# Patient Record
Sex: MALE | Race: BLACK OR AFRICAN AMERICAN | Employment: FULL TIME | URBAN - METROPOLITAN AREA
[De-identification: names, ages, dates, MRNs, and addresses within clinical notes are randomized per-mention and may not be internally consistent; named-entity substitution may affect disease eponyms.]

---

## 2024-07-24 ENCOUNTER — HOSPITAL ENCOUNTER (EMERGENCY)
Facility: HOSPITAL | Age: 49
Discharge: HOME | End: 2024-07-25
Attending: EMERGENCY MEDICINE
Payer: COMMERCIAL

## 2024-07-24 DIAGNOSIS — M11.20 PSEUDOGOUT: Primary | ICD-10-CM

## 2024-07-24 LAB
ANION GAP SERPL CALC-SCNC: 5 MMOL/L (ref 0–18)
BASOPHILS # BLD AUTO: 0.03 X10(3) UL (ref 0–0.2)
BASOPHILS NFR BLD AUTO: 0.3 %
BUN BLD-MCNC: 10 MG/DL (ref 9–23)
CALCIUM BLD-MCNC: 9.7 MG/DL (ref 8.7–10.4)
CHLORIDE SERPL-SCNC: 100 MMOL/L (ref 98–112)
CO2 SERPL-SCNC: 30 MMOL/L (ref 21–32)
CREAT BLD-MCNC: 1.13 MG/DL
EGFRCR SERPLBLD CKD-EPI 2021: 81 ML/MIN/1.73M2 (ref 60–?)
EOSINOPHIL # BLD AUTO: 0.24 X10(3) UL (ref 0–0.7)
EOSINOPHIL NFR BLD AUTO: 2.4 %
ERYTHROCYTE [DISTWIDTH] IN BLOOD BY AUTOMATED COUNT: 15.4 %
GLUCOSE BLD-MCNC: 125 MG/DL (ref 70–99)
HCT VFR BLD AUTO: 41.6 %
HGB BLD-MCNC: 14.6 G/DL
IMM GRANULOCYTES # BLD AUTO: 0.03 X10(3) UL (ref 0–1)
IMM GRANULOCYTES NFR BLD: 0.3 %
LYMPHOCYTES # BLD AUTO: 2.01 X10(3) UL (ref 1–4)
LYMPHOCYTES NFR BLD AUTO: 20.4 %
MCH RBC QN AUTO: 27.5 PG (ref 26–34)
MCHC RBC AUTO-ENTMCNC: 35.1 G/DL (ref 31–37)
MCV RBC AUTO: 78.3 FL
MONOCYTES # BLD AUTO: 0.95 X10(3) UL (ref 0.1–1)
MONOCYTES NFR BLD AUTO: 9.6 %
NEUTROPHILS # BLD AUTO: 6.6 X10 (3) UL (ref 1.5–7.7)
NEUTROPHILS # BLD AUTO: 6.6 X10(3) UL (ref 1.5–7.7)
NEUTROPHILS NFR BLD AUTO: 67 %
OSMOLALITY SERPL CALC.SUM OF ELEC: 281 MOSM/KG (ref 275–295)
PLATELET # BLD AUTO: 256 10(3)UL (ref 150–450)
POTASSIUM SERPL-SCNC: 3.5 MMOL/L (ref 3.5–5.1)
RBC # BLD AUTO: 5.31 X10(6)UL
SODIUM SERPL-SCNC: 135 MMOL/L (ref 136–145)
WBC # BLD AUTO: 9.9 X10(3) UL (ref 4–11)

## 2024-07-24 PROCEDURE — 89050 BODY FLUID CELL COUNT: CPT | Performed by: EMERGENCY MEDICINE

## 2024-07-24 PROCEDURE — 99285 EMERGENCY DEPT VISIT HI MDM: CPT

## 2024-07-24 PROCEDURE — 87070 CULTURE OTHR SPECIMN AEROBIC: CPT | Performed by: EMERGENCY MEDICINE

## 2024-07-24 PROCEDURE — 87205 SMEAR GRAM STAIN: CPT | Performed by: EMERGENCY MEDICINE

## 2024-07-24 PROCEDURE — 89060 EXAM SYNOVIAL FLUID CRYSTALS: CPT | Performed by: EMERGENCY MEDICINE

## 2024-07-24 PROCEDURE — 80048 BASIC METABOLIC PNL TOTAL CA: CPT | Performed by: EMERGENCY MEDICINE

## 2024-07-24 PROCEDURE — 85025 COMPLETE CBC W/AUTO DIFF WBC: CPT | Performed by: EMERGENCY MEDICINE

## 2024-07-24 PROCEDURE — 96375 TX/PRO/DX INJ NEW DRUG ADDON: CPT

## 2024-07-24 PROCEDURE — 20610 DRAIN/INJ JOINT/BURSA W/O US: CPT

## 2024-07-24 PROCEDURE — 96374 THER/PROPH/DIAG INJ IV PUSH: CPT

## 2024-07-24 PROCEDURE — 80053 COMPREHEN METABOLIC PANEL: CPT | Performed by: EMERGENCY MEDICINE

## 2024-07-24 RX ORDER — ONDANSETRON 2 MG/ML
4 INJECTION INTRAMUSCULAR; INTRAVENOUS ONCE
Status: COMPLETED | OUTPATIENT
Start: 2024-07-24 | End: 2024-07-24

## 2024-07-24 RX ORDER — MORPHINE SULFATE 4 MG/ML
4 INJECTION, SOLUTION INTRAMUSCULAR; INTRAVENOUS EVERY 30 MIN PRN
Status: DISCONTINUED | OUTPATIENT
Start: 2024-07-24 | End: 2024-07-25

## 2024-07-25 VITALS
OXYGEN SATURATION: 96 % | DIASTOLIC BLOOD PRESSURE: 68 MMHG | HEART RATE: 75 BPM | WEIGHT: 240 LBS | HEIGHT: 71 IN | TEMPERATURE: 99 F | SYSTOLIC BLOOD PRESSURE: 141 MMHG | RESPIRATION RATE: 18 BRPM | BODY MASS INDEX: 33.6 KG/M2

## 2024-07-25 LAB
ALBUMIN SERPL-MCNC: 4.6 G/DL (ref 3.2–4.8)
ALBUMIN/GLOB SERPL: 1.4 {RATIO} (ref 1–2)
ALP LIVER SERPL-CCNC: 52 U/L
ALT SERPL-CCNC: 14 U/L
ANION GAP SERPL CALC-SCNC: 6 MMOL/L (ref 0–18)
AST SERPL-CCNC: 18 U/L (ref ?–34)
BASOPHILS NFR SNV: 0 %
BILIRUB SERPL-MCNC: 1 MG/DL (ref 0.3–1.2)
BUN BLD-MCNC: 10 MG/DL (ref 9–23)
CALCIUM BLD-MCNC: 9.6 MG/DL (ref 8.7–10.4)
CHLORIDE SERPL-SCNC: 101 MMOL/L (ref 98–112)
CO2 SERPL-SCNC: 27 MMOL/L (ref 21–32)
COLOR FLD: YELLOW
CREAT BLD-MCNC: 1.13 MG/DL
CRYSTALS SNV QL MICRO: POSITIVE
EGFRCR SERPLBLD CKD-EPI 2021: 81 ML/MIN/1.73M2 (ref 60–?)
EOSINOPHIL NFR SNV: 0 %
GLOBULIN PLAS-MCNC: 3.2 G/DL (ref 2.8–4.4)
GLUCOSE BLD-MCNC: 125 MG/DL (ref 70–99)
GRANULOCYTES # SNV AUTO: ABNORMAL /MM3 (ref 0–200)
LYMPHOCYTES NFR SNV: 4 %
MONOS+MACROS NFR SNV: 0 %
NEUTROPHILS NFR SNV: 96 %
OSMOLALITY SERPL CALC.SUM OF ELEC: 279 MOSM/KG (ref 275–295)
POTASSIUM SERPL-SCNC: 3.4 MMOL/L (ref 3.5–5.1)
PROT SERPL-MCNC: 7.8 G/DL (ref 5.7–8.2)
RBC # FLD AUTO: ABNORMAL /MM3 (ref ?–1)
SODIUM SERPL-SCNC: 134 MMOL/L (ref 136–145)
TOTAL CELLS COUNTED FLD: 100

## 2024-07-25 PROCEDURE — 96376 TX/PRO/DX INJ SAME DRUG ADON: CPT

## 2024-07-25 RX ORDER — IBUPROFEN 800 MG/1
800 TABLET ORAL EVERY 8 HOURS PRN
Qty: 30 TABLET | Refills: 0 | Status: SHIPPED | OUTPATIENT
Start: 2024-07-25 | End: 2024-08-04

## 2024-07-25 RX ORDER — HYDROCODONE BITARTRATE AND ACETAMINOPHEN 5; 325 MG/1; MG/1
1-2 TABLET ORAL EVERY 6 HOURS PRN
Qty: 20 TABLET | Refills: 0 | Status: SHIPPED | OUTPATIENT
Start: 2024-07-25 | End: 2024-08-01

## 2024-07-25 RX ORDER — METHYLPREDNISOLONE 4 MG/1
TABLET ORAL
Qty: 21 TABLET | Refills: 0 | Status: SHIPPED | OUTPATIENT
Start: 2024-07-25

## 2024-07-25 NOTE — ED INITIAL ASSESSMENT (HPI)
Pt arrives to ED with right knee swelling that started two days ago. Denies injury. Pt developed a fever yesterday. Pt states he is unable to bear weight on the right leg. Denies chest pain or shortness of breath. States hx of gout as well.

## 2024-07-25 NOTE — ED PROVIDER NOTES
Patient Seen in: [facility] Emergency Department      History     Chief Complaint   Patient presents with   • Fever   • Swelling Edema            Stated Complaint: fever, right knee swelling, sent from IC    Subjective:   HPI    48-year-old male with a history of gout presenting with right knee pain.  Patient states for the last 2 days he is having pain in his right knee and now a fever.  He states he has a history of feels similar sometimes he does not have a fever with this.  There is no history of direct trauma or any other exacerbating factors or associated symptoms    Objective:   Past Medical History:   • Pulmonary embolism (HCC)              History reviewed. No pertinent surgical history.             Social History     Socioeconomic History   • Marital status: Single   Tobacco Use   • Smoking status: Never   • Smokeless tobacco: Never   Vaping Use   • Vaping status: Never Used   Substance and Sexual Activity   • Alcohol use: Yes   • Drug use: Never              Review of Systems    Positive for stated Chief Complaint: Fever and Swelling Edema (/)    Other systems are as noted in HPI.  Constitutional and vital signs reviewed.      All other systems reviewed and negative except as noted above.    Physical Exam     ED Triage Vitals [07/24/24 1918]   BP (!) 146/94   Pulse 89   Resp 18   Temp 99.5 °F (37.5 °C)   Temp src    SpO2 100 %   O2 Device None (Room air)       Current Vitals:   Vital Signs  BP: 139/84  Pulse: 79  Resp: 18  Temp: 99.5 °F (37.5 °C)  MAP (mmHg): (!) 111    Oxygen Therapy  SpO2: 96 %  O2 Device: None (Room air)            Physical Exam  Awake alert patient appears in no distress HEENT exam is normal lungs are clear cardiovascular exam regular rhythm abdomen soft nontender right knee effusion noted tender to palpation limited range of motion neurovascular tact distal area of injury with no tenderness no streaking redness no subcutaneous emphysema or crepitance      ED Course     Labs  Reviewed   BASIC METABOLIC PANEL (8) - Abnormal; Notable for the following components:       Result Value    Glucose 125 (*)     Sodium 135 (*)     All other components within normal limits   COMP METABOLIC PANEL (14) - Abnormal; Notable for the following components:    Glucose 125 (*)     Sodium 134 (*)     Potassium 3.4 (*)     All other components within normal limits   CBC W/ DIFFERENTIAL - Abnormal; Notable for the following components:    MCV 78.3 (*)     All other components within normal limits   CBC WITH DIFFERENTIAL WITH PLATELET    Narrative:     The following orders were created for panel order CBC With Differential With Platelet.  Procedure                               Abnormality         Status                     ---------                               -----------         ------                     CBC W/ DIFFERENTIAL[[id number]]          Abnormal            Final result                 Please view results for these tests on the individual orders.   CELL CT/DIF & CRYSTAL SYNOVIAL   CELL CT/DIF & CRYSTAL SYNOVIAL    Narrative:     The following orders were created for panel order Cell CT/DIF & Crystal Synovial fld.  Procedure                               Abnormality         Status                     ---------                               -----------         ------                     Cell Count,Diff, and Cry...[[id number]]                      In process                   Please view results for these tests on the individual orders.   CELL COUNT AND CRYSTALS SYNOVIAL   PATH COMMENT   BODY FLUID CULT,AEROBIC AND ANAEROBIC             differential diagnosis includes septic joint, gout         MDM                                         Medical Decision Making  48-year-old male presenting to the emergency department for right knee pain.  IV established cardiac monitor shows a sinus rhythm pulse ox shows no signs of hypoxia CBC shows a normal white cell count metabolic panel stable renal function.   Patient denies consented for right knee arthrocentesis.  Patient including draped in sterile fashion a small wheal of 1% lidocaine with epinephrine was instilled into the right superior lateral area.  Using an 18-gauge needle it was placed within the knee joint and bloody cloudy fluid was aspirated.  Approximately 30 cc.  It was sent to the lab for analysis CBC shows normal white cell count metabolic panel stable renal function glucose level normal Gram stain negative cell fluid shows a predominance of neutrophils cell count currently pending culture was sent off for further analysis predominance of neutrophils clinically the patient has exam intact patient will be started on anti-inflammatories he was given risk benefits of using this in conjunction with his Xarelto the patient's will restart after Medrol Dosepak and Norco for breakthrough pain he has a knee immobilizer he is return emerged part worsening symptoms or other complaints    Problems Addressed:  Pseudogout: acute illness or injury    Amount and/or Complexity of Data Reviewed  Labs: ordered. Decision-making details documented in ED Course.  ECG/medicine tests: ordered and independent interpretation performed. Decision-making details documented in ED Course.        Disposition and Plan     Clinical Impression:  1. Pseudogout         Disposition:  Discharge  7/25/2024  3:39 am    Follow-up:  No follow-up provider specified.        Medications Prescribed:  Current Discharge Medication List        START taking these medications    Details   HYDROcodone-acetaminophen 5-325 MG Oral Tab Take 1-2 tablets by mouth every 6 (six) hours as needed.  Qty: 20 tablet, Refills: 0    Associated Diagnoses: Pseudogout      ibuprofen 800 MG Oral Tab Take 1 tablet (800 mg total) by mouth every 8 (eight) hours as needed for Pain.  Qty: 30 tablet, Refills: 0    Associated Diagnoses: Pseudogout

## (undated) NOTE — LETTER
Date & Time: 7/25/2024, 4:00 AM  Patient: Dajuan Munson  Encounter Provider(s):    Ministerio Brito MD       To Whom It May Concern:    Dajuan Munson was seen and treated in our department on 7/24/2024. He should not return to work until 7/29/2024 .    If you have any questions or concerns, please do not hesitate to call.        _____________________________  Physician/[facility] Signature